# Patient Record
Sex: MALE | Race: WHITE | NOT HISPANIC OR LATINO | Employment: UNEMPLOYED | URBAN - METROPOLITAN AREA
[De-identification: names, ages, dates, MRNs, and addresses within clinical notes are randomized per-mention and may not be internally consistent; named-entity substitution may affect disease eponyms.]

---

## 2024-01-24 DIAGNOSIS — N50.89 TESTICULAR MASS: Primary | ICD-10-CM

## 2024-01-30 ENCOUNTER — OFFICE VISIT (OUTPATIENT)
Dept: UROLOGY | Facility: CLINIC | Age: 44
End: 2024-01-30
Payer: MEDICARE

## 2024-01-30 DIAGNOSIS — N50.89 TESTICULAR MASS: ICD-10-CM

## 2024-01-30 PROCEDURE — 99204 OFFICE O/P NEW MOD 45 MIN: CPT | Mod: S$GLB,,, | Performed by: NURSE PRACTITIONER

## 2024-01-30 RX ORDER — BUPROPION HYDROCHLORIDE 300 MG/1
300 TABLET ORAL
COMMUNITY
Start: 2024-01-15

## 2024-01-30 RX ORDER — BUPROPION HYDROCHLORIDE 150 MG/1
150 TABLET ORAL
COMMUNITY
Start: 2023-09-27

## 2024-01-30 RX ORDER — ANASTROZOLE 1 MG/1
1 TABLET ORAL WEEKLY
COMMUNITY
Start: 2024-01-11

## 2024-01-30 RX ORDER — TESTOSTERONE CYPIONATE 200 MG/ML
INJECTION, SOLUTION INTRAMUSCULAR
COMMUNITY
Start: 2024-01-15

## 2024-01-30 RX ORDER — CLONAZEPAM 2 MG/1
2 TABLET ORAL 2 TIMES DAILY
COMMUNITY
Start: 2023-11-22

## 2024-01-30 NOTE — PROGRESS NOTES
Subjective:       Patient ID: Joaquin Burkett is a 43 y.o. male.    Chief Complaint: No chief complaint on file.      HPI: 43-year-old male, new to [facility], referred for testicular mass.    Patient states he has had testicular mass for about 6-7 months.  He does have episodes of pain.  States his pain is off and on.  Pain is currently 0/10 with episodes of 10/10.  Describes the pain as being thought in the testicle.  Pain is worse with touching it.  Denies any unexpected weight loss.  Denies any pain or burning urination.  Denies any odor urine denies any fever body aches.  Denies any blood in urine.    No other urinary complaints at this time.         Past Medical History:   Past Medical History:   Diagnosis Date    COPD (chronic obstructive pulmonary disease)     DM (diabetes mellitus)     Emphysema, unspecified     Mild intermittent asthma, uncomplicated        Past Surgical Historical: History reviewed. No pertinent surgical history.     Medications:   Medication List with Changes/Refills   Current Medications    ANASTROZOLE (ARIMIDEX) 1 MG TAB    Take 1 mg by mouth once a week.    BUPROPION (WELLBUTRIN XL) 150 MG TB24 TABLET    Take 150 mg by mouth.    BUPROPION (WELLBUTRIN XL) 300 MG 24 HR TABLET    Take 300 mg by mouth.    CLONAZEPAM (KLONOPIN) 2 MG TAB    Take 2 mg by mouth 2 (two) times daily.    TESTOSTERONE CYPIONATE (DEPOTESTOTERONE CYPIONATE) 200 MG/ML INJECTION    INJECT 1 mL BY INTRAMUSCULAR ROUTE AS DIRECTED ONCE WEEKLY        Past Social History:   Social History     Socioeconomic History    Marital status:    Tobacco Use    Passive exposure: Never    Smokeless tobacco: Never   Substance and Sexual Activity    Alcohol use: Not Currently    Drug use: Never    Sexual activity: Yes       Allergies:   Review of patient's allergies indicates:   Allergen Reactions    Tylenol-codeine #2         Family History:   Family History   Problem Relation Age of Onset    Diabetes Mother     Hypertension  Mother     Diabetes Father     Valvular heart disease Father         Review of Systems:  Review of Systems   Constitutional:  Negative for activity change and appetite change.   HENT:  Negative for congestion and dental problem.    Eyes:  Negative for visual disturbance.   Respiratory:  Negative for chest tightness and shortness of breath.    Cardiovascular:  Negative for chest pain.   Gastrointestinal:  Negative for abdominal distention and abdominal pain.   Genitourinary:  Negative for decreased urine volume, difficulty urinating, dysuria, enuresis, flank pain, frequency, genital sores, hematuria, penile discharge, penile pain, penile swelling, scrotal swelling, testicular pain and urgency.   Musculoskeletal:  Negative for back pain and neck pain.   Skin:  Negative for color change.   Neurological:  Negative for dizziness.   Hematological:  Negative for adenopathy.   Psychiatric/Behavioral:  Negative for agitation, behavioral problems and confusion.        Physical Exam:  Physical Exam  Vitals and nursing note reviewed.   Constitutional:       Appearance: He is well-developed.   HENT:      Head: Normocephalic.   Eyes:      Pupils: Pupils are equal, round, and reactive to light.   Cardiovascular:      Rate and Rhythm: Normal rate and regular rhythm.      Heart sounds: Normal heart sounds.   Pulmonary:      Effort: Pulmonary effort is normal.      Breath sounds: Normal breath sounds.   Abdominal:      General: Bowel sounds are normal.      Palpations: Abdomen is soft.   Genitourinary:     Penis: Normal.       Testes: Normal.   Musculoskeletal:         General: Normal range of motion.      Cervical back: Normal range of motion and neck supple.   Skin:     General: Skin is warm and dry.   Neurological:      Mental Status: He is alert and oriented to person, place, and time.   Psychiatric:         Behavior: Behavior normal.         Assessment/Plan:   Testicular mass:  Ultrasound on 01/15/2024 shows a hypoechoic mass in  the left testicle with his suspicious for neoplasm.    Imaging discussed with Dr. Moy.  We are going to send the patient for stat ultrasound for re-evaluation.      Follow-up to be arranged pending ultrasound.  Problem List Items Addressed This Visit    None  Visit Diagnoses       Testicular mass        Relevant Orders    US Scrotum And Testicles

## 2024-01-31 ENCOUNTER — TELEPHONE (OUTPATIENT)
Dept: UROLOGY | Facility: CLINIC | Age: 44
End: 2024-01-31
Payer: MEDICARE

## 2024-01-31 NOTE — TELEPHONE ENCOUNTER
Returned pt's call. I advised report came in late 1/30/24 but provider in [facility] today.    Please advise.

## 2024-01-31 NOTE — TELEPHONE ENCOUNTER
----- Message from Kinga Matta sent at 1/31/2024  2:41 PM CST -----  Contact: Jackie(wife)  Jackie called to consult with nurse or staff regarding the patients recent visit. She states patient had an US and was told he would be contacted regarding the results. She would like to check on the status of this and would like a call back. She can be reached at [phone] or [phone]. Thanks/MR

## 2024-02-01 ENCOUNTER — TELEPHONE (OUTPATIENT)
Dept: UROLOGY | Facility: CLINIC | Age: 44
End: 2024-02-01
Payer: MEDICARE

## 2024-02-01 DIAGNOSIS — N50.89 TESTICULAR MASS: Primary | ICD-10-CM

## 2024-02-01 NOTE — TELEPHONE ENCOUNTER
----- Message from Jase Quintero NP sent at 2/1/2024  8:14 AM CST -----  Ultra sound shows a 5.1 mm hypoechoic focus of the left testicle.  Nonspecific and could relate to prior injury.  This ultra sound shows favorable results compared to previous ultra sound.  Will repeat ultra sound in 2-3 months.